# Patient Record
Sex: MALE | Race: BLACK OR AFRICAN AMERICAN | URBAN - METROPOLITAN AREA
[De-identification: names, ages, dates, MRNs, and addresses within clinical notes are randomized per-mention and may not be internally consistent; named-entity substitution may affect disease eponyms.]

---

## 2017-08-24 ENCOUNTER — GENERIC CONVERSION - ENCOUNTER (OUTPATIENT)
Dept: OTHER | Facility: OTHER | Age: 82
End: 2017-08-24

## 2018-12-28 ENCOUNTER — OFFICE VISIT (OUTPATIENT)
Dept: URBAN - METROPOLITAN AREA CLINIC 76 | Facility: CLINIC | Age: 83
End: 2018-12-28
Payer: MEDICARE

## 2018-12-28 DIAGNOSIS — Z96.1 PRESENCE OF INTRAOCULAR LENS: ICD-10-CM

## 2018-12-28 PROCEDURE — 92020 GONIOSCOPY: CPT | Performed by: OPHTHALMOLOGY

## 2018-12-28 PROCEDURE — 92014 COMPRE OPH EXAM EST PT 1/>: CPT | Performed by: OPHTHALMOLOGY

## 2018-12-28 RX ORDER — DORZOLAMIDE HCL 20 MG/ML
2 % SOLUTION/ DROPS OPHTHALMIC
Qty: 3 | Refills: 2 | Status: ACTIVE
Start: 2018-12-28

## 2018-12-28 ASSESSMENT — INTRAOCULAR PRESSURE
OD: 8
OS: 18

## 2018-12-28 NOTE — IMPRESSION/PLAN
Impression: Primary open-angle glaucoma, bilateral, moderate stage: R21.5843. OU. S/P Trab OU, S/P Shunt OU. IOP OD good, IOP OS higher today. no significant diurnal variation. Plan: Continue to monitor. Continue Latanoprost QHS OU, Timolol BID OU, Brimonidine BID OU. Discussed option of starting additional meds for OS. Will start Dorzolamide BID OS only.

## 2019-01-29 ENCOUNTER — OFFICE VISIT (OUTPATIENT)
Dept: URBAN - METROPOLITAN AREA CLINIC 76 | Facility: CLINIC | Age: 84
End: 2019-01-29
Payer: MEDICARE

## 2019-01-29 PROCEDURE — 99213 OFFICE O/P EST LOW 20 MIN: CPT | Performed by: OPHTHALMOLOGY

## 2019-01-29 ASSESSMENT — INTRAOCULAR PRESSURE
OD: 10
OS: 11

## 2019-01-29 NOTE — IMPRESSION/PLAN
Impression: Primary open-angle glaucoma, bilateral, moderate stage: H60.8381. OU. S/P Trab OU, S/P Shunt OU. IOP OD good, IOP OS improved w/ Dorzolamide 
no significant diurnal variation. Plan: Continue to monitor. Continue Latanoprost QHS OU, Timolol BID OU, Brimonidine BID OU. Dorzolamide BID OS only.

## 2019-03-25 ENCOUNTER — OFFICE VISIT (OUTPATIENT)
Dept: URBAN - METROPOLITAN AREA CLINIC 76 | Facility: CLINIC | Age: 84
End: 2019-03-25
Payer: MEDICARE

## 2019-03-25 DIAGNOSIS — H40.1132 PRIMARY OPEN-ANGLE GLAUCOMA, BILATERAL, MODERATE STAGE: Primary | ICD-10-CM

## 2019-03-25 PROCEDURE — 99213 OFFICE O/P EST LOW 20 MIN: CPT | Performed by: OPHTHALMOLOGY

## 2019-03-25 ASSESSMENT — INTRAOCULAR PRESSURE
OS: 10
OD: 10

## 2019-03-25 NOTE — IMPRESSION/PLAN
Impression: Primary open-angle glaucoma, bilateral, moderate stage: I45.7409. OU. S/P Trab OU, S/P Shunt OU. IOP OU good today. no significant diurnal variation. Plan: Continue to monitor. Continue Latanoprost QHS OU, Timolol BID OU, Brimonidine BID OU. Dorzolamide BID OS only. Needs updated tests.

## 2019-06-25 ENCOUNTER — OFFICE VISIT (OUTPATIENT)
Dept: URBAN - METROPOLITAN AREA CLINIC 76 | Facility: CLINIC | Age: 84
End: 2019-06-25
Payer: MEDICARE

## 2019-06-25 DIAGNOSIS — H52.4 PRESBYOPIA: ICD-10-CM

## 2019-06-25 PROCEDURE — 92083 EXTENDED VISUAL FIELD XM: CPT | Performed by: OPHTHALMOLOGY

## 2019-06-25 PROCEDURE — 92014 COMPRE OPH EXAM EST PT 1/>: CPT | Performed by: OPHTHALMOLOGY

## 2019-06-25 PROCEDURE — 92133 CPTRZD OPH DX IMG PST SGM ON: CPT | Performed by: OPHTHALMOLOGY

## 2019-06-25 ASSESSMENT — INTRAOCULAR PRESSURE
OD: 9
OS: 10

## 2019-06-25 NOTE — IMPRESSION/PLAN
Impression: Presbyopia: H52.4. OU. Plan: Ok to schedule refraction for SV near gls only if/when pt would like.

## 2019-06-25 NOTE — IMPRESSION/PLAN
Impression: Primary open-angle glaucoma, bilateral, moderate stage: K79.4160. OU. S/P Trab OU, S/P Shunt OU. IOP OU good today. no significant diurnal variation. Reviewed VF/OCT- stable compared to previous. Plan: Continue to monitor. Continue Latanoprost QHS OU, Timolol BID OU, Brimonidine BID OU. Dorzolamide BID OS only. Pt recently diagnosed w/ Parkinson's. Ok to monitor q6 mos. Will monitor w/ DE/OCT only, no VF.